# Patient Record
Sex: MALE | Race: OTHER | HISPANIC OR LATINO | URBAN - METROPOLITAN AREA
[De-identification: names, ages, dates, MRNs, and addresses within clinical notes are randomized per-mention and may not be internally consistent; named-entity substitution may affect disease eponyms.]

---

## 2017-10-29 ENCOUNTER — EMERGENCY (EMERGENCY)
Facility: HOSPITAL | Age: 27
LOS: 1 days | Discharge: HOME | End: 2017-10-29
Attending: EMERGENCY MEDICINE | Admitting: EMERGENCY MEDICINE
Payer: SELF-PAY

## 2017-10-29 VITALS
HEIGHT: 68 IN | HEART RATE: 74 BPM | SYSTOLIC BLOOD PRESSURE: 127 MMHG | TEMPERATURE: 98 F | WEIGHT: 179.9 LBS | OXYGEN SATURATION: 99 % | DIASTOLIC BLOOD PRESSURE: 77 MMHG | RESPIRATION RATE: 20 BRPM

## 2017-10-29 LAB
ALBUMIN SERPL ELPH-MCNC: 4.9 G/DL — SIGNIFICANT CHANGE UP (ref 3.3–5.2)
ALP SERPL-CCNC: 164 U/L — HIGH (ref 40–120)
ALT FLD-CCNC: 27 U/L — SIGNIFICANT CHANGE UP
ANION GAP SERPL CALC-SCNC: 15 MMOL/L — SIGNIFICANT CHANGE UP (ref 5–17)
APTT BLD: 29.4 SEC — SIGNIFICANT CHANGE UP (ref 27.5–37.4)
AST SERPL-CCNC: 25 U/L — SIGNIFICANT CHANGE UP
BASOPHILS # BLD AUTO: 0 K/UL — SIGNIFICANT CHANGE UP (ref 0–0.2)
BASOPHILS NFR BLD AUTO: 0.2 % — SIGNIFICANT CHANGE UP (ref 0–2)
BILIRUB SERPL-MCNC: 0.6 MG/DL — SIGNIFICANT CHANGE UP (ref 0.4–2)
BUN SERPL-MCNC: 12 MG/DL — SIGNIFICANT CHANGE UP (ref 8–20)
CALCIUM SERPL-MCNC: 9.8 MG/DL — SIGNIFICANT CHANGE UP (ref 8.6–10.2)
CHLORIDE SERPL-SCNC: 100 MMOL/L — SIGNIFICANT CHANGE UP (ref 98–107)
CO2 SERPL-SCNC: 24 MMOL/L — SIGNIFICANT CHANGE UP (ref 22–29)
CREAT SERPL-MCNC: 0.97 MG/DL — SIGNIFICANT CHANGE UP (ref 0.5–1.3)
EOSINOPHIL # BLD AUTO: 0 K/UL — SIGNIFICANT CHANGE UP (ref 0–0.5)
EOSINOPHIL NFR BLD AUTO: 0.6 % — SIGNIFICANT CHANGE UP (ref 0–5)
GLUCOSE SERPL-MCNC: 97 MG/DL — SIGNIFICANT CHANGE UP (ref 70–115)
HCT VFR BLD CALC: 47.5 % — SIGNIFICANT CHANGE UP (ref 42–52)
HGB BLD-MCNC: 17.2 G/DL — SIGNIFICANT CHANGE UP (ref 14–18)
INR BLD: 1.06 RATIO — SIGNIFICANT CHANGE UP (ref 0.88–1.16)
LYMPHOCYTES # BLD AUTO: 1.5 K/UL — SIGNIFICANT CHANGE UP (ref 1–4.8)
LYMPHOCYTES # BLD AUTO: 16.8 % — LOW (ref 20–55)
MCHC RBC-ENTMCNC: 31.2 PG — HIGH (ref 27–31)
MCHC RBC-ENTMCNC: 36.2 G/DL — HIGH (ref 32–36)
MCV RBC AUTO: 86.1 FL — SIGNIFICANT CHANGE UP (ref 80–94)
MONOCYTES # BLD AUTO: 0.5 K/UL — SIGNIFICANT CHANGE UP (ref 0–0.8)
MONOCYTES NFR BLD AUTO: 5.8 % — SIGNIFICANT CHANGE UP (ref 3–10)
NEUTROPHILS # BLD AUTO: 6.8 K/UL — SIGNIFICANT CHANGE UP (ref 1.8–8)
NEUTROPHILS NFR BLD AUTO: 76.5 % — HIGH (ref 37–73)
PLATELET # BLD AUTO: 212 K/UL — SIGNIFICANT CHANGE UP (ref 150–400)
POTASSIUM SERPL-MCNC: 4 MMOL/L — SIGNIFICANT CHANGE UP (ref 3.5–5.3)
POTASSIUM SERPL-SCNC: 4 MMOL/L — SIGNIFICANT CHANGE UP (ref 3.5–5.3)
PROT SERPL-MCNC: 8.8 G/DL — HIGH (ref 6.6–8.7)
PROTHROM AB SERPL-ACNC: 11.7 SEC — SIGNIFICANT CHANGE UP (ref 9.8–12.7)
RBC # BLD: 5.52 M/UL — SIGNIFICANT CHANGE UP (ref 4.6–6.2)
RBC # FLD: 13.3 % — SIGNIFICANT CHANGE UP (ref 11–15.6)
SODIUM SERPL-SCNC: 139 MMOL/L — SIGNIFICANT CHANGE UP (ref 135–145)
TROPONIN T SERPL-MCNC: <0.01 NG/ML — SIGNIFICANT CHANGE UP (ref 0–0.06)
WBC # BLD: 8.9 K/UL — SIGNIFICANT CHANGE UP (ref 4.8–10.8)
WBC # FLD AUTO: 8.9 K/UL — SIGNIFICANT CHANGE UP (ref 4.8–10.8)

## 2017-10-29 PROCEDURE — 85610 PROTHROMBIN TIME: CPT

## 2017-10-29 PROCEDURE — 84484 ASSAY OF TROPONIN QUANT: CPT

## 2017-10-29 PROCEDURE — 93005 ELECTROCARDIOGRAM TRACING: CPT

## 2017-10-29 PROCEDURE — 71020: CPT | Mod: 26

## 2017-10-29 PROCEDURE — 36415 COLL VENOUS BLD VENIPUNCTURE: CPT

## 2017-10-29 PROCEDURE — 99283 EMERGENCY DEPT VISIT LOW MDM: CPT | Mod: 25

## 2017-10-29 PROCEDURE — 85730 THROMBOPLASTIN TIME PARTIAL: CPT

## 2017-10-29 PROCEDURE — 80053 COMPREHEN METABOLIC PANEL: CPT

## 2017-10-29 PROCEDURE — 71046 X-RAY EXAM CHEST 2 VIEWS: CPT

## 2017-10-29 PROCEDURE — 99285 EMERGENCY DEPT VISIT HI MDM: CPT

## 2017-10-29 PROCEDURE — 85027 COMPLETE CBC AUTOMATED: CPT

## 2017-10-29 NOTE — ED ADULT TRIAGE NOTE - CHIEF COMPLAINT QUOTE
last night with anxiety and chest pain. no cardiac history.  Pain is worse with inspiration and travels around. last night with anxiety and chest pain. no cardiac history.  Pain is worse with inspiration and travels around. This has happened in the past per patient. all negative work ups.

## 2017-10-29 NOTE — ED ADULT NURSE NOTE - CHIEF COMPLAINT QUOTE
last night with anxiety and chest pain. no cardiac history.  Pain is worse with inspiration and travels around. This has happened in the past per patient. all negative work ups.

## 2017-10-29 NOTE — ED STATDOCS - ATTENDING CONTRIBUTION TO CARE
I, Dr. Rueda, performed the initial face to face bedside interview with this patient regarding history of present illness, review of symptoms and relevant past medical, social and family history.  I completed an independent physical examination.  I was the initial provider who evaluated this patient. I have signed out the follow up of any pending tests (i.e. labs, radiological studies) to the ACP.  I have communicated the patient’s plan of care and disposition with the ACP.

## 2017-10-29 NOTE — ED STATDOCS - PROGRESS NOTE DETAILS
Pt is a 27 y/o male c/o of intermittent chest pain and shortness of breath that started five months ago. Pt denies any cardiac disease in family and sudden cardiac death. Agreed with HPI/ROS/PE/Orders from intake  General: Well appearing, in no distress, sitting comfortably Cardio: Regular rate and rhythm, S1/S2, no murmurs (patient currently not experiencing chest pain on exam, no chest wall tenderness) Resp: Clear to auscultation b/l, vesicular breath sounds, no wheezes, rales or rhonchi Abd: Soft, nontender + BS   Follow up with plan from intake Chest X-ray: Unremarkable, CBC/Coags/CMP - unremarkable , EKG : Normal sinus rhythm   Pt is explained of results and discharge instruction, pt verbalizes understanding results and discharge instructions, pt instructed to follow up with cardiology will be given referral

## 2017-10-29 NOTE — ED STATDOCS - OBJECTIVE STATEMENT
27 y/o M with hx of HLD presents to the ED c/o intermittent sharp chest pain and shortness of breath that began 5 months ago. Pt states there are events that trigger his sx but does mention he tends to develop an episode after playing soccer; sx never onset during play. Episodes tend to last ~2 minutes and resolve on their own, last episode occurred earlier today, no triggering events. Denies fever, chills, LOC. He endorses chills to his hands and feet during episodes. Pt reports relief of pain with deep inspiration. Pt has had EKG and blood work done 1 month ago that resulted normal only found high cholesterol, no CXR. Pt currently does not have any chest pain or shortness of breath but does mention some dizziness which is also associated with these episodes. Currently taking HLD medications. Denies illicit drug use. Denies hx of anxiety. Denies FHx of CAD. No further complaints at this time. Patient is Portuguese speaking, girlfriend at bedside used as .